# Patient Record
Sex: FEMALE | Race: WHITE | NOT HISPANIC OR LATINO | Employment: OTHER | URBAN - METROPOLITAN AREA
[De-identification: names, ages, dates, MRNs, and addresses within clinical notes are randomized per-mention and may not be internally consistent; named-entity substitution may affect disease eponyms.]

---

## 2023-06-15 ENCOUNTER — OFFICE VISIT (OUTPATIENT)
Dept: URGENT CARE | Facility: URGENT CARE | Age: 68
End: 2023-06-15
Payer: COMMERCIAL

## 2023-06-15 VITALS
OXYGEN SATURATION: 99 % | SYSTOLIC BLOOD PRESSURE: 122 MMHG | RESPIRATION RATE: 16 BRPM | DIASTOLIC BLOOD PRESSURE: 81 MMHG | TEMPERATURE: 97.6 F | HEART RATE: 70 BPM

## 2023-06-15 DIAGNOSIS — K21.00 GASTROESOPHAGEAL REFLUX DISEASE WITH ESOPHAGITIS WITHOUT HEMORRHAGE: Primary | ICD-10-CM

## 2023-06-15 DIAGNOSIS — R53.83 OTHER FATIGUE: ICD-10-CM

## 2023-06-15 DIAGNOSIS — R07.89 ATYPICAL CHEST PAIN: ICD-10-CM

## 2023-06-15 DIAGNOSIS — Z86.2 HISTORY OF ANEMIA: ICD-10-CM

## 2023-06-15 PROBLEM — C67.9 MALIGNANT NEOPLASM OF URINARY BLADDER (H): Status: ACTIVE | Noted: 2023-06-15

## 2023-06-15 LAB
BASOPHILS # BLD AUTO: 0 10E3/UL (ref 0–0.2)
BASOPHILS NFR BLD AUTO: 0 %
EOSINOPHIL # BLD AUTO: 0.1 10E3/UL (ref 0–0.7)
EOSINOPHIL NFR BLD AUTO: 1 %
ERYTHROCYTE [DISTWIDTH] IN BLOOD BY AUTOMATED COUNT: 12.4 % (ref 10–15)
HCT VFR BLD AUTO: 40.7 % (ref 35–47)
HGB BLD-MCNC: 13.3 G/DL (ref 11.7–15.7)
IMM GRANULOCYTES # BLD: 0 10E3/UL
IMM GRANULOCYTES NFR BLD: 0 %
LYMPHOCYTES # BLD AUTO: 2.3 10E3/UL (ref 0.8–5.3)
LYMPHOCYTES NFR BLD AUTO: 46 %
MCH RBC QN AUTO: 30 PG (ref 26.5–33)
MCHC RBC AUTO-ENTMCNC: 32.7 G/DL (ref 31.5–36.5)
MCV RBC AUTO: 92 FL (ref 78–100)
MONOCYTES # BLD AUTO: 0.5 10E3/UL (ref 0–1.3)
MONOCYTES NFR BLD AUTO: 10 %
NEUTROPHILS # BLD AUTO: 2.2 10E3/UL (ref 1.6–8.3)
NEUTROPHILS NFR BLD AUTO: 43 %
PLATELET # BLD AUTO: 287 10E3/UL (ref 150–450)
RBC # BLD AUTO: 4.44 10E6/UL (ref 3.8–5.2)
WBC # BLD AUTO: 5.1 10E3/UL (ref 4–11)

## 2023-06-15 PROCEDURE — 85025 COMPLETE CBC W/AUTO DIFF WBC: CPT | Performed by: PHYSICIAN ASSISTANT

## 2023-06-15 PROCEDURE — 93000 ELECTROCARDIOGRAM COMPLETE: CPT | Performed by: PHYSICIAN ASSISTANT

## 2023-06-15 PROCEDURE — 36415 COLL VENOUS BLD VENIPUNCTURE: CPT | Performed by: PHYSICIAN ASSISTANT

## 2023-06-15 PROCEDURE — 99204 OFFICE O/P NEW MOD 45 MIN: CPT | Performed by: PHYSICIAN ASSISTANT

## 2023-06-15 RX ORDER — ACETAMINOPHEN 500 MG
500-1000 TABLET ORAL EVERY 6 HOURS PRN
COMMUNITY

## 2023-06-15 ASSESSMENT — ENCOUNTER SYMPTOMS
DIARRHEA: 0
BLOOD IN STOOL: 0
VOMITING: 0
FEVER: 0
FATIGUE: 1

## 2023-06-15 NOTE — PROGRESS NOTES
Assessment & Plan:        ICD-10-CM    1. Gastroesophageal reflux disease with esophagitis without hemorrhage  K21.00 CBC with platelets and differential     omeprazole (PRILOSEC) 20 MG DR capsule     CBC with platelets and differential      2. Atypical chest pain  R07.89 EKG 12-lead complete w/read - Clinics      3. History of anemia  Z86.2 CBC with platelets and differential     CBC with platelets and differential      4. Other fatigue  R53.83 CBC with platelets and differential     CBC with platelets and differential            Plan/Clinical Decision Making:    Patient with acute epigastric pain with GERD symptoms for several weeks. Has had increased stress, increased alcohol and hx of NSAID use. Not currently drinking or using NSAIDs. No bleed in stool. Will start patient on daily Prilosec once a day. Monitor for improvement.     Had some radiating pain into chest. No history of heart disease. Normal heart exam and no edema.   EKG: NSR. Monitor for improvement with GERD treatment.     Also has hx of anemia and feeling fatigue recently. Has had stress, recent move, wedding and busy with family. Will check CBC today. Will call patient if abnormal. I reviewed after visit and normal hemoglobin, CBC today.       Return if symptoms worsen or fail to improve, for in 5-7 days.   In urgent care.     Establish with PCP for Follow up with chronic conditions and GERD in 2-4 weeks.     At the end of the encounter, I discussed results, diagnosis, medications. Discussed red flags for immediate return to clinic/ER, as well as indications for follow up if no improvement. Patient understood and agreed to plan. Patient was stable for discharge.        Lindsay Bolanos PA-C on 6/15/2023 at 11:55 AM          Subjective:     HPI:    Any is a 68 year old female who presents to clinic today for the following health issues:  Chief Complaint   Patient presents with     Urgent Care     Abdominal Pain     Upper abdomen/chest and  shoulder pain-Gets better with eating-Hx of stomach ulcers-Feels the same way      HPI    For several weeks having pain in stomach, radiates into mid chest and to shoulders.   Hx of ulcer in the past. Has had gastric bypass. No blood in stool, no melena.   Taking Tums to help symptoms.   Having extreme stress this year.     History obtained from the patient.    Nonsmoker  Alcohol- usually once a week, had more alcohol recently due to travel and wedding.   Was using NSAIDs in April. Not currently.     Has been having fatigue, hx of anemia in past both B12 and iron.       Review of Systems   Constitutional: Positive for fatigue. Negative for fever.   Gastrointestinal: Negative for blood in stool, diarrhea and vomiting.         Patient Active Problem List   Diagnosis     Malignant neoplasm of urinary bladder (H)        No past medical history on file.    Social History     Tobacco Use     Smoking status: Never     Passive exposure: Never     Smokeless tobacco: Never   Vaping Use     Vaping status: Not on file   Substance Use Topics     Alcohol use: Not on file             Objective:     Vitals:    06/15/23 1110   BP: 122/81   Pulse: 70   Resp: 16   Temp: 97.6  F (36.4  C)   TempSrc: Tympanic   SpO2: 99%         Physical Exam   EXAM:   Pleasant, alert, appropriate appearance. NAD.  Head Exam: Normocephalic, atraumatic.  Eye Exam:  PERRLA, EOMI, non icteric/injection.    Ear Exam: TMs grey without bulging. Normal canals.  Normal pinna.  Nose Exam: Normal external nose.    OroPharynx Exam:  Moist mucous membranes. No erythema, pharynx without exudate or hypertrophy.  Neck/Thyroid Exam:  No LAD.    Chest/Respiratory Exam: CTAB.  Cardiovascular Exam: RRR. No murmur or rubs.  ABD: soft, epigastric tenderness, no rebound/guarding.  No masses/organomegaly.  Ext/musculoskeletal: Grossly intact, No edema  Neuro: CN II-XII intact grossly intact.  Skin: no rash or lesion.      Results:  Results for orders placed or performed in  visit on 06/15/23   CBC with platelets and differential     Status: None   Result Value Ref Range    WBC Count 5.1 4.0 - 11.0 10e3/uL    RBC Count 4.44 3.80 - 5.20 10e6/uL    Hemoglobin 13.3 11.7 - 15.7 g/dL    Hematocrit 40.7 35.0 - 47.0 %    MCV 92 78 - 100 fL    MCH 30.0 26.5 - 33.0 pg    MCHC 32.7 31.5 - 36.5 g/dL    RDW 12.4 10.0 - 15.0 %    Platelet Count 287 150 - 450 10e3/uL    % Neutrophils 43 %    % Lymphocytes 46 %    % Monocytes 10 %    % Eosinophils 1 %    % Basophils 0 %    % Immature Granulocytes 0 %    Absolute Neutrophils 2.2 1.6 - 8.3 10e3/uL    Absolute Lymphocytes 2.3 0.8 - 5.3 10e3/uL    Absolute Monocytes 0.5 0.0 - 1.3 10e3/uL    Absolute Eosinophils 0.1 0.0 - 0.7 10e3/uL    Absolute Basophils 0.0 0.0 - 0.2 10e3/uL    Absolute Immature Granulocytes 0.0 <=0.4 10e3/uL   CBC with platelets and differential     Status: None    Narrative    The following orders were created for panel order CBC with platelets and differential.  Procedure                               Abnormality         Status                     ---------                               -----------         ------                     CBC with platelets and d...[[id number]]                      Final result                 Please view results for these tests on the individual orders.

## 2023-06-23 ENCOUNTER — TELEPHONE (OUTPATIENT)
Dept: PEDIATRICS | Facility: CLINIC | Age: 68
End: 2023-06-23
Payer: MEDICARE

## 2023-06-23 NOTE — TELEPHONE ENCOUNTER
Pt called to get her lab results from her UC visit on 6/15/23  Relayed results to pt   Pt verbalized understanding and agrees to the plan    Thank you  Juan Carlos Melissa RN on 6/23/2023 at 10:34 AM

## 2023-08-25 ENCOUNTER — HOSPITAL ENCOUNTER (EMERGENCY)
Facility: CLINIC | Age: 68
Discharge: HOME | End: 2023-08-25
Attending: EMERGENCY MEDICINE | Admitting: EMERGENCY MEDICINE
Payer: COMMERCIAL

## 2023-08-25 ENCOUNTER — NURSE TRIAGE (OUTPATIENT)
Dept: NURSING | Facility: CLINIC | Age: 68
End: 2023-08-25
Payer: MEDICARE

## 2023-08-25 VITALS
DIASTOLIC BLOOD PRESSURE: 91 MMHG | RESPIRATION RATE: 18 BRPM | SYSTOLIC BLOOD PRESSURE: 148 MMHG | HEART RATE: 67 BPM | OXYGEN SATURATION: 99 % | TEMPERATURE: 98.4 F
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
## 2023-08-25 DIAGNOSIS — S61.451A DOG BITE OF RIGHT HAND, INITIAL ENCOUNTER: ICD-10-CM

## 2023-08-25 PROCEDURE — 99283 EMERGENCY DEPT VISIT LOW MDM: CPT

## 2023-08-25 PROCEDURE — 250N000013 HC RX MED GY IP 250 OP 250 PS 637: Performed by: EMERGENCY MEDICINE

## 2023-08-25 RX ADMIN — AMOXICILLIN AND CLAVULANATE POTASSIUM 1 TABLET: 875; 125 TABLET, FILM COATED ORAL at 21:11

## 2023-08-25 ASSESSMENT — ACTIVITIES OF DAILY LIVING (ADL): ADLS_ACUITY_SCORE: 33

## 2023-08-26 NOTE — DISCHARGE INSTRUCTIONS
Return to ER immediately if you develop: Redness, swelling or purulent drainage from the wound., Fever > 101, persistent nausea or vomiting OR you have any other concerns about your health.

## 2023-08-26 NOTE — ED TRIAGE NOTES
Pt bit by daughter's dog PTA. Dog is vaccinated. Puncture wound to right hand. Bleeding controlled. ABCs intact. A&OX4.      Triage Assessment       Row Name 08/25/23 2016       Triage Assessment (Adult)    Airway WDL WDL       Respiratory WDL    Respiratory WDL WDL       Skin Circulation/Temperature WDL    Skin Circulation/Temperature WDL WDL       Cardiac WDL    Cardiac WDL WDL       Peripheral/Neurovascular WDL    Peripheral Neurovascular WDL WDL       Cognitive/Neuro/Behavioral WDL    Cognitive/Neuro/Behavioral WDL WDL

## 2023-08-26 NOTE — ED PROVIDER NOTES
History     Chief Complaint:  Dog Bite     The history is provided by the patient.      Any Mccarthy is a 68 year old female who presents to the ED for evaluation of a dog bite. The patient reports she was bit by her daughters dog in her right hand. Patient is right handed. Patient is up to date with tetanus shot and dog is fully vaccinated. States her hand burns. Patient mentions she has had a dog bite before and had problems with infection, despite being on antibiotics. Denies numbness or tingling in hand.    Independent Historian:   None - Patient Only    Review of External Notes:        Medications:    Prilosec    Past Medical History:    Malignant neoplasm of urinary bladder    Past Surgical History:    The patient has no pertinent past surgical history.     Physical Exam   Patient Vitals for the past 24 hrs:   BP Temp Temp src Pulse Resp SpO2   08/25/23 2119 (!) 148/91 -- -- 67 18 99 %   08/25/23 2017 (!) 149/102 98.4  F (36.9  C) Temporal 75 18 99 %      Physical Exam  CV: ppi, regular   Resp: speaking in full sentences without any resp distress  Extremity: Right hand ulnar aspect of the first MCP joint and the first web spaces there is a 1.5 cm wound extends through the dermis but does not involve the joint capsule.  No foreign body present.  Proximal to this along the first MTP there are multiple superficial abrasions.  5 out of 5 motor throughout the hand including FDS FDP extension of all digits.  Distal sensation and perfusion are intact.  Full range of motion of the thumb in all axes.  Skin: warm dry well perfused  Neuro: Alert, no gross motor or sensory deficits,  gait stable            Emergency Department Course   Emergency Department Course & Assessments:     Interventions:  Medications   amoxicillin-clavulanate (AUGMENTIN) 875-125 MG per tablet 1 tablet (1 tablet Oral $Given 8/25/23 2111)      Assessments/Consultations/Discussion of Management or Tests:  ED Course as of 08/25/23 2122    Fri Aug 25, 2023   2049 I obtained history and examined the patient as noted above.      Social Determinants of Health affecting care:   None    Disposition:  The patient was discharged to home.     Impression & Plan    Medical Decision Makin-year-old female here with a dog bite to the right hand.  Will start on Augmentin.  Low suspicion for bony injury no radiograph indicated.  Wound explored no violation of the joint capsule no foreign body.  States she is up-to-date on her tetanus shot.  Wound was irrigated and explored here in the emergency room through range of motion.    Diagnosis:    ICD-10-CM    1. Dog bite of right hand, initial encounter  S61.451A     W54.0XXA          Discharge Medications:  New Prescriptions    AMOXICILLIN-CLAVULANATE (AUGMENTIN) 875-125 MG TABLET    Take 1 tablet by mouth 2 times daily for 7 days      Scribe Disclosure:  SONIA PACHECO, am serving as a scribe at 8:51 PM on 2023 to document services personally performed by Rene Macias MD based on my observations and the provider's statements to me.     2023   Rene Macias MD Walker, Jerome Richard, MD  23 0011

## 2023-08-26 NOTE — TELEPHONE ENCOUNTER
Triage Call:    Patient calling to inquire if Urgent Care has openings tonight at the [place] location.  She was bit by her dog, bleeding has stopped.    Informed patient that the [facility] closes at 2000, they may not accept additional patient depending on their patient load.  Informed patient that the [facility] in [place] is open until 2100.    No additional concerns or questions.    Joann Lacey RN  08/25/23 7:49 PM  [facility] Nurse Advisor  Reason for Disposition   General information question, no triage required and triager able to answer question    Additional Information   Negative: [1] Caller is not with the adult (patient) AND [2] reporting urgent symptoms   Negative: Lab result questions   Negative: Medication questions   Negative: Caller can't be reached by phone   Negative: Caller has already spoken to PCP or another triager   Negative: RN needs further essential information from caller in order to complete triage   Negative: Requesting regular office appointment   Negative: [1] Caller requesting NON-URGENT health information AND [2] PCP's office is the best resource   Negative: Health Information question, no triage required and triager able to answer question    Protocols used: Information Only Call - No Triage-A-

## 2023-09-28 ENCOUNTER — HOSPITAL ENCOUNTER (OUTPATIENT)
Dept: MAMMOGRAPHY | Facility: CLINIC | Age: 68
Discharge: HOME | End: 2023-09-28
Attending: FAMILY MEDICINE | Admitting: FAMILY MEDICINE
Payer: COMMERCIAL

## 2023-09-28 DIAGNOSIS — Z12.31 VISIT FOR SCREENING MAMMOGRAM: ICD-10-CM

## 2023-09-28 PROCEDURE — 77067 SCR MAMMO BI INCL CAD: CPT

## 2023-10-28 ENCOUNTER — HEALTH MAINTENANCE LETTER (OUTPATIENT)
Age: 68
End: 2023-10-28

## 2024-12-21 ENCOUNTER — HEALTH MAINTENANCE LETTER (OUTPATIENT)
Age: 69
End: 2024-12-21

## 2025-05-14 ENCOUNTER — HOSPITAL ENCOUNTER (OUTPATIENT)
Dept: MAMMOGRAPHY | Facility: CLINIC | Age: 70
Discharge: HOME | End: 2025-05-14
Attending: FAMILY MEDICINE
Payer: COMMERCIAL

## 2025-05-14 DIAGNOSIS — Z12.31 VISIT FOR SCREENING MAMMOGRAM: ICD-10-CM

## 2025-05-14 PROCEDURE — 77063 BREAST TOMOSYNTHESIS BI: CPT
